# Patient Record
Sex: MALE | Race: WHITE | NOT HISPANIC OR LATINO | Employment: UNEMPLOYED | URBAN - METROPOLITAN AREA
[De-identification: names, ages, dates, MRNs, and addresses within clinical notes are randomized per-mention and may not be internally consistent; named-entity substitution may affect disease eponyms.]

---

## 2019-10-23 ENCOUNTER — NON-PROVIDER VISIT (OUTPATIENT)
Dept: OCCUPATIONAL MEDICINE | Facility: CLINIC | Age: 54
End: 2019-10-23

## 2019-10-23 DIAGNOSIS — Z02.1 PRE-EMPLOYMENT DRUG SCREENING: ICD-10-CM

## 2019-10-23 LAB
AMP AMPHETAMINE: NORMAL
COC COCAINE: NORMAL
INT CON NEG: NORMAL
INT CON POS: NORMAL
MET METHAMPHETAMINES: NORMAL
OPI OPIATES: NORMAL
PCP PHENCYCLIDINE: NORMAL
POC DRUG COMMENT 753798-OCCUPATIONAL HEALTH: NORMAL
THC: NORMAL

## 2019-10-23 PROCEDURE — 80305 DRUG TEST PRSMV DIR OPT OBS: CPT | Performed by: PREVENTIVE MEDICINE

## 2022-01-21 ENCOUNTER — APPOINTMENT (OUTPATIENT)
Dept: RADIOLOGY | Facility: MEDICAL CENTER | Age: 57
End: 2022-01-21
Attending: EMERGENCY MEDICINE
Payer: COMMERCIAL

## 2022-01-21 ENCOUNTER — HOSPITAL ENCOUNTER (EMERGENCY)
Facility: MEDICAL CENTER | Age: 57
End: 2022-01-21
Attending: EMERGENCY MEDICINE
Payer: COMMERCIAL

## 2022-01-21 VITALS
RESPIRATION RATE: 17 BRPM | DIASTOLIC BLOOD PRESSURE: 92 MMHG | TEMPERATURE: 98.4 F | SYSTOLIC BLOOD PRESSURE: 156 MMHG | OXYGEN SATURATION: 98 % | HEIGHT: 74 IN | WEIGHT: 191.8 LBS | HEART RATE: 76 BPM | BODY MASS INDEX: 24.62 KG/M2

## 2022-01-21 DIAGNOSIS — K46.9 ABDOMINAL HERNIA WITHOUT OBSTRUCTION AND WITHOUT GANGRENE, RECURRENCE NOT SPECIFIED, UNSPECIFIED HERNIA TYPE: ICD-10-CM

## 2022-01-21 LAB
ALBUMIN SERPL BCP-MCNC: 4.1 G/DL (ref 3.2–4.9)
ALBUMIN/GLOB SERPL: 1.6 G/DL
ALP SERPL-CCNC: 65 U/L (ref 30–99)
ALT SERPL-CCNC: 16 U/L (ref 2–50)
ANION GAP SERPL CALC-SCNC: 10 MMOL/L (ref 7–16)
APTT PPP: 26 SEC (ref 24.7–36)
AST SERPL-CCNC: 17 U/L (ref 12–45)
BASOPHILS # BLD AUTO: 0.5 % (ref 0–1.8)
BASOPHILS # BLD: 0.03 K/UL (ref 0–0.12)
BILIRUB SERPL-MCNC: 0.4 MG/DL (ref 0.1–1.5)
BUN SERPL-MCNC: 13 MG/DL (ref 8–22)
CALCIUM SERPL-MCNC: 9 MG/DL (ref 8.4–10.2)
CHLORIDE SERPL-SCNC: 101 MMOL/L (ref 96–112)
CO2 SERPL-SCNC: 27 MMOL/L (ref 20–33)
CREAT SERPL-MCNC: 0.74 MG/DL (ref 0.5–1.4)
EOSINOPHIL # BLD AUTO: 0.09 K/UL (ref 0–0.51)
EOSINOPHIL NFR BLD: 1.6 % (ref 0–6.9)
ERYTHROCYTE [DISTWIDTH] IN BLOOD BY AUTOMATED COUNT: 43.4 FL (ref 35.9–50)
GLOBULIN SER CALC-MCNC: 2.6 G/DL (ref 1.9–3.5)
GLUCOSE SERPL-MCNC: 93 MG/DL (ref 65–99)
HCT VFR BLD AUTO: 42 % (ref 42–52)
HGB BLD-MCNC: 14.2 G/DL (ref 14–18)
IMM GRANULOCYTES # BLD AUTO: 0.02 K/UL (ref 0–0.11)
IMM GRANULOCYTES NFR BLD AUTO: 0.4 % (ref 0–0.9)
INR PPP: 1.03 (ref 0.87–1.13)
LACTATE BLD-SCNC: 1.1 MMOL/L (ref 0.5–2)
LYMPHOCYTES # BLD AUTO: 1.32 K/UL (ref 1–4.8)
LYMPHOCYTES NFR BLD: 23.3 % (ref 22–41)
MCH RBC QN AUTO: 31.2 PG (ref 27–33)
MCHC RBC AUTO-ENTMCNC: 33.8 G/DL (ref 33.7–35.3)
MCV RBC AUTO: 92.3 FL (ref 81.4–97.8)
MONOCYTES # BLD AUTO: 0.39 K/UL (ref 0–0.85)
MONOCYTES NFR BLD AUTO: 6.9 % (ref 0–13.4)
NEUTROPHILS # BLD AUTO: 3.82 K/UL (ref 1.82–7.42)
NEUTROPHILS NFR BLD: 67.3 % (ref 44–72)
NRBC # BLD AUTO: 0 K/UL
NRBC BLD-RTO: 0 /100 WBC
PLATELET # BLD AUTO: 270 K/UL (ref 164–446)
PMV BLD AUTO: 8.8 FL (ref 9–12.9)
POTASSIUM SERPL-SCNC: 4.1 MMOL/L (ref 3.6–5.5)
PROT SERPL-MCNC: 6.7 G/DL (ref 6–8.2)
PROTHROMBIN TIME: 12.7 SEC (ref 12–14.6)
RBC # BLD AUTO: 4.55 M/UL (ref 4.7–6.1)
SODIUM SERPL-SCNC: 138 MMOL/L (ref 135–145)
WBC # BLD AUTO: 5.7 K/UL (ref 4.8–10.8)

## 2022-01-21 PROCEDURE — 99283 EMERGENCY DEPT VISIT LOW MDM: CPT

## 2022-01-21 PROCEDURE — 85730 THROMBOPLASTIN TIME PARTIAL: CPT

## 2022-01-21 PROCEDURE — 87040 BLOOD CULTURE FOR BACTERIA: CPT

## 2022-01-21 PROCEDURE — 85025 COMPLETE CBC W/AUTO DIFF WBC: CPT

## 2022-01-21 PROCEDURE — 36415 COLL VENOUS BLD VENIPUNCTURE: CPT

## 2022-01-21 PROCEDURE — 83605 ASSAY OF LACTIC ACID: CPT

## 2022-01-21 PROCEDURE — 74177 CT ABD & PELVIS W/CONTRAST: CPT

## 2022-01-21 PROCEDURE — 85610 PROTHROMBIN TIME: CPT

## 2022-01-21 PROCEDURE — 80053 COMPREHEN METABOLIC PANEL: CPT

## 2022-01-21 PROCEDURE — 700117 HCHG RX CONTRAST REV CODE 255: Performed by: EMERGENCY MEDICINE

## 2022-01-21 RX ADMIN — IOHEXOL 100 ML: 350 INJECTION, SOLUTION INTRAVENOUS at 17:28

## 2022-01-21 NOTE — ED TRIAGE NOTES
"Chief Complaint   Patient presents with   • Abscess     LLQ abd abscess, \"I got an infection that needs to be drained.\"   • [facility]     ED Triage Vitals [01/21/22 1334]   Enc Vitals Group      Blood Pressure (!) 164/92      Pulse 80      Respiration 20      Temperature 37.1 °C (98.7 °F)      Temp src Temporal      Pulse Oximetry 98 %      Weight 87 kg (191 lb 12.8 oz)      Height 1.88 m (6' 2\")     "

## 2022-01-21 NOTE — ED PROVIDER NOTES
"ED Provider Note    CHIEF COMPLAINT  Chief Complaint   Patient presents with   • Abscess     LLQ abd abscess, \"I got an infection that needs to be drained.\"   • [facility]  Wale Mcclendon is a 56 y.o. male who presents stating that he has no significant past medical history, he reports that previously his primary care doctor has done an incision and drainage of an abscess of his left lower quadrant area.  Abscess now has recurred and the primary care doctor sent the patient in for imaging to determine the extent of the abscess.  The patient denies any fever, no vomiting.    REVIEW OF SYSTEMS  See HPI for further details. All other systems are negative.     PAST MEDICAL HISTORY   has a past medical history of Patient denies medical problems.    SOCIAL HISTORY  Social History     Tobacco Use   • Smoking status: Never Smoker   • Smokeless tobacco: Former User   Substance and Sexual Activity   • Alcohol use: Yes     Comment: 1-2 daily   • Drug use: Never   • Sexual activity: Not on file       SURGICAL HISTORY  patient denies any surgical history    CURRENT MEDICATIONS  Home Medications     Reviewed by Pierre Bauman R.N. (Registered Nurse) on 01/21/22 at 1336  Med List Status: Complete   Medication Last Dose Status        Patient Seun Taking any Medications                       ALLERGIES  No Known Allergies    FAMILY HISTORY  No pertinent family history    PHYSICAL EXAM  VITAL SIGNS: BP (!) 164/92   Pulse 80   Temp 37.1 °C (98.7 °F) (Temporal)   Resp 20   Ht 1.88 m (6' 2\")   Wt 87 kg (191 lb 12.8 oz)   SpO2 98%   BMI 24.63 kg/m²  @[id number]::@   Pulse ox interpretation: *I interpret this pulse ox as normal.  Constitutional: Alert in no apparent distress.  HENT: No signs of trauma, Bilateral external ears normal, Nose normal.   Eyes: Pupils are equal and reactive, Conjunctiva normal, Non-icteric.   Neck: Normal range of motion, No tenderness, Supple, No " "stridor.   Lymphatic: No lymphadenopathy noted.   Cardiovascular: Regular rate and rhythm, no murmurs.   Thorax & Lungs: Normal breath sounds, No respiratory distress, No wheezing, No chest tenderness.   Abdomen: Bowel sounds normal, Soft, large mass bilateral inguinal area.  Skin: Warm, Dry, No erythema, No rash.   Back: No bony tenderness, No CVA tenderness.   Extremities: Intact distal pulses, No edema, No tenderness, No cyanosis.  Musculoskeletal: Good range of motion in all major joints. No tenderness to palpation or major deformities noted.   Neurologic: Alert , Normal motor function, Normal sensory function, No focal deficits noted.   Psychiatric: Affect normal, Judgment normal, Mood normal.       DIAGNOSTIC STUDIES / PROCEDURES    LABS  Labs Reviewed   CBC WITH DIFFERENTIAL - Abnormal; Notable for the following components:       Result Value    RBC 4.55 (*)     MPV 8.8 (*)     All other components within normal limits    Narrative:     Indicate which anticoagulants the patient is on:->UNKNOWN   COMP METABOLIC PANEL    Narrative:     Indicate which anticoagulants the patient is on:->UNKNOWN   BLOOD CULTURE    Narrative:     Per Hospital Policy: Only change Specimen Src: to \"Line\" if  specified by physician order.   BLOOD CULTURE    Narrative:     Per Hospital Policy: Only change Specimen Src: to \"Line\" if  specified by physician order.   PROTHROMBIN TIME    Narrative:     Indicate which anticoagulants the patient is on:->UNKNOWN   APTT    Narrative:     Indicate which anticoagulants the patient is on:->UNKNOWN   LACTIC ACID    Narrative:     Indicate which anticoagulants the patient is on:->UNKNOWN   ESTIMATED GFR    Narrative:     Indicate which anticoagulants the patient is on:->UNKNOWN   LACTIC ACID         RADIOLOGY  CT-ABDOMEN-PELVIS WITH   Final Result      1.  Large left inguinal hernia contains a loop of sigmoid colon abdominal fat. No evidence of obstruction.      2.  Small right inguinal hernia " contains only abdominal fat.      3.  No evidence of pelvic or intra-abdominal abscess.              COURSE & MEDICAL DECISION MAKING  Pertinent Labs & Imaging studies reviewed. (See chart for details)    Patient presents with large abscess of the left lower quadrant.  Labs were ordered, CT scan was ordered to evaluate.    Patient's labs are normal including white blood count.    CT scan is positive for bilateral inguinal hernias.    At this point it appears the patient has bilateral inguinal hernias and he was thinking that they were abscesses, however, he only has 2 very superficial areas draining with no evidence of abscess.    The patient has no evidence of obstruction.  He has no vomiting, he has normal bowel movements.  He will be referred to the general surgeon on-call for hernia repair.    The patient will return for new or worsening symptoms and is stable at the time of discharge.    The patient is referred to a primary physician for blood pressure management, diabetic screening, and for all other preventative health concerns.      DISPOSITION:  Patient will be discharged home in stable condition.    FOLLOW UP:  [facility], Emergency Dept  [address] [facility] [id number] [phone]    If symptoms worsen    Pierre Kohli M.D.  [address] [facility]  [address]  [place] [id number] [phone]      As needed    Eugene Suzao M.D.  [address] [facility]  [address]  [place] [id number] [phone]      call for follow up for hernia repair      OUTPATIENT MEDICATIONS:  New Prescriptions    No medications on file           The patient will return for worsening symptoms and is stable at the time of discharge. The patient verbalizes understanding and will comply.    FINAL IMPRESSION  1. Abdominal hernia without obstruction and without gangrene, recurrence not specified, unspecified hernia type                Electronically signed by: Tony Hernandez M.D., 1/21/2022  2:40 PM

## 2022-01-22 NOTE — DISCHARGE INSTRUCTIONS
Hernia, Adult         A hernia is the bulging of an organ or tissue through a weak spot in the muscles of the abdomen (abdominal wall). Hernias develop most often near the belly button (navel) or the area where the leg meets the lower abdomen (groin).  Common types of hernias include:  · Incisional hernia. This type bulges through a scar from an abdominal surgery.  · Umbilical hernia. This type develops near the navel.  · Inguinal hernia. This type develops in the groin or scrotum.  · Femoral hernia. This type develops under the groin, in the upper thigh area.  · Hiatal hernia. This type occurs when part of the stomach slides above the muscle that separates the abdomen from the chest (diaphragm).  What are the causes?  This condition may be caused by:  · Heavy lifting.  · Coughing over a long period of time.  · Straining to have a bowel movement. Constipation can lead to straining.  · An incision made during an abdominal surgery.  · A physical problem that is present at birth (congenital defect).  · Being overweight or obese.  · Smoking.  · Excess fluid in the abdomen.  · Undescended testicles in males.  What are the signs or symptoms?  The main symptom is a skin-colored, rounded bulge in the area of the hernia. However, a bulge may not always be present. It may grow bigger or be more visible when you cough or strain (such as when lifting something heavy).  A hernia that can be pushed back into the area (is reducible) rarely causes pain. A hernia that cannot be pushed back into the area (is incarcerated) may lose its blood supply (become strangulated). A hernia that is incarcerated may cause:  · Pain.  · Fever.  · Nausea and vomiting.  · Swelling.  · Constipation.  How is this diagnosed?  A hernia may be diagnosed based on:  · Your symptoms and medical history.  · A physical exam. Your health care provider may ask you to cough or move in certain ways to see if the hernia becomes visible.  · Imaging tests, such  as:  ? X-rays.  ? Ultrasound.  ? CT scan.  How is this treated?  A hernia that is small and painless may not need to be treated. A hernia that is large or painful may be treated with surgery. Inguinal hernias may be treated with surgery to prevent incarceration or strangulation. Strangulated hernias are always treated with surgery because a lack of blood supply to the trapped organ or tissue can cause it to die.  Surgery to treat a hernia involves pushing the bulge back into place and repairing the weak area of the muscle or abdominal wall.  Follow these instructions at home:  Activity  · Avoid straining.  · Do not lift anything that is heavier than 10 lb (4.5 kg), or the limit that you are told, until your health care provider says that it is safe.  · When lifting heavy objects, lift with your leg muscles, not your back muscles.  Preventing constipation  · Take actions to prevent constipation. Constipation leads to straining with bowel movements, which can make a hernia worse or cause a hernia repair to break down. Your health care provider may recommend that you:  ? Drink enough fluid to keep your urine pale yellow.  ? Eat foods that are high in fiber, such as fresh fruits and vegetables, whole grains, and beans.  ? Limit foods that are high in fat and processed sugars, such as fried or sweet foods.  ? Take an over-the-counter or prescription medicine for constipation.  General instructions  · When coughing, try to cough gently.  · You may try to push the hernia back in place by very gently pressing on it while lying down. Do not try to force the bulge back in if it will not push in easily.  · If you are overweight, work with your health care provider to lose weight safely.  · Do not use any products that contain nicotine or tobacco, such as cigarettes and e-cigarettes. If you need help quitting, ask your health care provider.  · If you are scheduled for hernia repair, watch your hernia for any changes in shape,  size, or color. Tell your health care provider about any changes or new symptoms.  · Take over-the-counter and prescription medicines only as told by your health care provider.  · Keep all follow-up visits as told by your health care provider. This is important.  Contact a health care provider if:  · You develop new pain, swelling, or redness around your hernia.  · You have signs of constipation, such as:  ? Fewer bowel movements in a week than normal.  ? Difficulty having a bowel movement.  ? Stools that are dry, hard, or larger than normal.  Get help right away if:  · You have a fever.  · You have abdomen pain that gets worse.  · You feel nauseous or you vomit.  · You cannot push the hernia back in place by very gently pressing on it while lying down. Do not try to force the bulge back in if it will not push in easily.  · The hernia:  ? Changes in shape, size, or color.  ? Feels hard or tender.  These symptoms may represent a serious problem that is an emergency. Do not wait to see if the symptoms will go away. Get medical help right away. Call your local emergency services (911 in the [place]  Summary  · A hernia is the bulging of an organ or tissue through a weak spot in the muscles of the abdomen (abdominal wall).  · The main symptom is a skin-colored, rounded lump (bulge) in the hernia area. However, a bulge may not always be present. It may grow bigger or more visible when you cough or strain (such as when having a bowel movement).  · A hernia that is small and painless may not need to be treated. A hernia that is large or painful may be treated with surgery.  · Surgery to treat a hernia involves pushing the bulge back into place and repairing the weak part of the abdomen.  This information is not intended to replace advice given to you by your health care provider. Make sure you discuss any questions you have with your health care provider.  Document Released: 12/18/2006 Document Revised: 04/09/2020 Document  Reviewed: 09/19/2018  [employer] Patient Education © 2020 [employer]

## 2022-01-26 LAB
BACTERIA BLD CULT: NORMAL
BACTERIA BLD CULT: NORMAL
SIGNIFICANT IND 70042: NORMAL
SIGNIFICANT IND 70042: NORMAL
SITE SITE: NORMAL
SITE SITE: NORMAL
SOURCE SOURCE: NORMAL
SOURCE SOURCE: NORMAL